# Patient Record
Sex: FEMALE | Race: WHITE | Employment: FULL TIME | URBAN - METROPOLITAN AREA
[De-identification: names, ages, dates, MRNs, and addresses within clinical notes are randomized per-mention and may not be internally consistent; named-entity substitution may affect disease eponyms.]

---

## 2021-10-31 ENCOUNTER — HOSPITAL ENCOUNTER (EMERGENCY)
Age: 21
Discharge: HOME | End: 2021-10-31
Attending: STUDENT IN AN ORGANIZED HEALTH CARE EDUCATION/TRAINING PROGRAM
Payer: COMMERCIAL

## 2021-10-31 VITALS
HEIGHT: 66 IN | BODY MASS INDEX: 33.75 KG/M2 | DIASTOLIC BLOOD PRESSURE: 74 MMHG | TEMPERATURE: 99.6 F | RESPIRATION RATE: 16 BRPM | OXYGEN SATURATION: 99 % | SYSTOLIC BLOOD PRESSURE: 131 MMHG | HEART RATE: 85 BPM | WEIGHT: 210 LBS

## 2021-10-31 DIAGNOSIS — J06.9 UPPER RESPIRATORY TRACT INFECTION, UNSPECIFIED TYPE: Primary | ICD-10-CM

## 2021-10-31 DIAGNOSIS — Z11.52 ENCOUNTER FOR SCREENING FOR COVID-19: ICD-10-CM

## 2021-10-31 LAB
DEPRECATED S PYO AG THROAT QL EIA: NEGATIVE
FLUAV AG NPH QL IA: NEGATIVE
FLUBV AG NOSE QL IA: NEGATIVE
SARS-COV-2, COV2: NORMAL

## 2021-10-31 PROCEDURE — 87804 INFLUENZA ASSAY W/OPTIC: CPT

## 2021-10-31 PROCEDURE — U0005 INFEC AGEN DETEC AMPLI PROBE: HCPCS

## 2021-10-31 PROCEDURE — 74011250637 HC RX REV CODE- 250/637: Performed by: STUDENT IN AN ORGANIZED HEALTH CARE EDUCATION/TRAINING PROGRAM

## 2021-10-31 PROCEDURE — 99283 EMERGENCY DEPT VISIT LOW MDM: CPT

## 2021-10-31 PROCEDURE — 87880 STREP A ASSAY W/OPTIC: CPT

## 2021-10-31 RX ORDER — IBUPROFEN 400 MG/1
400 TABLET ORAL
Status: COMPLETED | OUTPATIENT
Start: 2021-10-31 | End: 2021-10-31

## 2021-10-31 RX ORDER — ACETAMINOPHEN 500 MG
1000 TABLET ORAL
Status: COMPLETED | OUTPATIENT
Start: 2021-10-31 | End: 2021-10-31

## 2021-10-31 RX ADMIN — ACETAMINOPHEN 1000 MG: 500 TABLET ORAL at 13:56

## 2021-10-31 RX ADMIN — IBUPROFEN 400 MG: 400 TABLET, FILM COATED ORAL at 13:56

## 2021-11-01 LAB
SARS-COV-2, XPLCVT: NOT DETECTED
SOURCE, COVRS: NORMAL

## 2023-01-01 NOTE — ED TRIAGE NOTES
Pt c/o headache that began this morning. Also reports sore throat, runny nose, chills. Mamta Rivas  (RN)  2023 05:46:30

## 2023-04-26 ENCOUNTER — APPOINTMENT (OUTPATIENT)
Dept: GENERAL RADIOLOGY | Age: 23
End: 2023-04-26
Attending: STUDENT IN AN ORGANIZED HEALTH CARE EDUCATION/TRAINING PROGRAM
Payer: COMMERCIAL

## 2023-04-26 ENCOUNTER — HOSPITAL ENCOUNTER (EMERGENCY)
Age: 23
Discharge: HOME | End: 2023-04-27
Attending: STUDENT IN AN ORGANIZED HEALTH CARE EDUCATION/TRAINING PROGRAM
Payer: COMMERCIAL

## 2023-04-26 VITALS
WEIGHT: 206 LBS | HEIGHT: 64 IN | RESPIRATION RATE: 18 BRPM | OXYGEN SATURATION: 99 % | TEMPERATURE: 98 F | BODY MASS INDEX: 35.17 KG/M2 | DIASTOLIC BLOOD PRESSURE: 73 MMHG | SYSTOLIC BLOOD PRESSURE: 128 MMHG | HEART RATE: 98 BPM

## 2023-04-26 DIAGNOSIS — S86.912A MUSCLE STRAIN OF LEFT LOWER LEG, INITIAL ENCOUNTER: Primary | ICD-10-CM

## 2023-04-26 PROCEDURE — 99283 EMERGENCY DEPT VISIT LOW MDM: CPT

## 2023-04-26 PROCEDURE — 73630 X-RAY EXAM OF FOOT: CPT

## 2023-04-27 NOTE — ED PROVIDER NOTES
[facility]  EMERGENCY DEPARTMENT ENCOUNTER NOTE        Date: 4/26/2023  Patient Name: Lnyda Stinson      History of Presenting Illness     Chief Complaint   Patient presents with    Ankle Pain    Leg Pain       History Provided By: Patient    HPI: Lynda Stinson, 26 y.o. female with no chronic past medical history of note who comes to the ED with leg injury. She reports that she was on ladder. On the shelf and when she landed she landed on her left foot resulting in pain with movement of the foot, ankle, and calf. She denies any other injuries. She is reporting every time she is moving her calf she feels stretching and pulling sensation. PCP: None        Past History     Past Medical History:  History reviewed. No pertinent past medical history. Past Surgical History:  No past surgical history on file. Family History:  History reviewed. No pertinent family history. Social History: Allergies:  No Known Allergies      Review of Systems     Review of Systems    A 10 point review of system was performed and was negative except as noted above in HPI    Physical Exam     Physical Exam  Vitals and nursing note reviewed. Constitutional:       General: She is not in acute distress. Appearance: She is not ill-appearing, toxic-appearing or diaphoretic. HENT:      Head: Normocephalic and atraumatic. Eyes:      Extraocular Movements: Extraocular movements intact. Conjunctiva/sclera: Conjunctivae normal.   Cardiovascular:      Rate and Rhythm: Normal rate and regular rhythm. Pulmonary:      Effort: Pulmonary effort is normal. No respiratory distress. Musculoskeletal:      Right lower leg: Normal.      Left lower leg: Tenderness present. No swelling, deformity, lacerations or bony tenderness. No edema.       Right ankle: Normal.      Right Achilles Tendon: Normal.      Left ankle: Normal.      Left Achilles Tendon: Normal.      Right foot: Normal.      Left foot: Normal range of motion and normal capillary refill. Tenderness present. No swelling, deformity, bunion, Charcot foot, foot drop, prominent metatarsal heads, laceration, bony tenderness or crepitus. Normal pulse. Neurological:      Mental Status: She is alert and oriented to person, place, and time. Sensory: No sensory deficit. Motor: No weakness. Lab and Diagnostic Study Results     Labs -   No results found for this or any previous visit (from the past 12 hour(s)). Radiologic Studies -   [unfilled]  CT Results  (Last 48 hours)      None          CXR Results  (Last 48 hours)      None            Medical Decision Making and ED Course   - I am the first and primary provider for this patient AND AM THE PRIMARY PROVIDER OF RECORD. - I reviewed the vital signs, available nursing notes, past medical history, past surgical history, family history and social history. - Initial assessment performed. The patients presenting problems have been discussed, and the staff are in agreement with the care plan formulated and outlined with them. I have encouraged them to ask questions as they arise throughout their visit. Vital Signs-Reviewed the patient's vital signs. Patient Vitals for the past 24 hrs:   Temp Pulse Resp BP SpO2   04/26/23 2351 -- -- -- 128/73 --   04/26/23 2210 98 °F (36.7 °C) 98 18 (!) 155/78 99 %       Records Reviewed: Nursing notes      Medical Decision Making       Previously healthy 26-year-old female comes to the ED with chief complaint of lower leg pain on the left. She reports that she landed wrongfully and complaining of pain. Her examination revealed tenderness to the foot. Differential diagnosis includes sprain, strain, or fracture. An x-ray was obtained to rule out the possibility of fracture. X-ray was unremarkable. Her presentation today is not concerning for an acute vascular or nerve injury.   Sensation is intact and motor is intact bilaterally. Pulses are symmetric and equal.  There is no difference between both legs and calf muscles. Presentation is not concerning for DVT or acute infectious process. Patient was placed in a walking boot. Symptomatically improved significantly and she was able to ambulate. Thus, will discharge her with symptomatic treatment with Tylenol and ibuprofen as needed and referred to follow-up with orthopedics within 5 to 7 days for reevaluation. ED evaluation, work-up, clinical impression, and disposition was discussed with the patient. Patient is agreeable. Patient verbalized understanding and will be able to arrange follow-up. Anticipatory guidance and return precautions discussed with the patient. At the time of discharge, all concerns have been addressed and patient had no further questions. Patient is hemodynamically stable and appropriate for discharge. Diagnosis     Clinical Impression:   1. Muscle strain of left lower leg, initial encounter        Disposition     Disposition: Condition improved  DC- Adult Discharges: All of the diagnostic tests were reviewed and questions answered. Diagnosis, care plan and treatment options were discussed. The patient understands the instructions and will follow up as directed. The patients results have been reviewed with them. They have been counseled regarding their diagnosis. The patient verbally convey understanding and agreement of the signs, symptoms, diagnosis, treatment and prognosis and additionally agrees to follow up as recommended with their PCP in 24 - 48 hours. They also agree with the care-plan and convey that all of their questions have been answered.   I have also put together some discharge instructions for them that include: 1) educational information regarding their diagnosis, 2) how to care for their diagnosis at home, as well a 3) list of reasons why they would want to return to the ED prior to their follow-up appointment, should their condition change. Discharged      DISCHARGE PLAN:  1. Follow-up Information       Follow up With Specialties Details Why [address]    [address] [facility] EMERGENCY DEPT Emergency Medicine Go to  As needed, If symptoms worsen [address] [facility] [id number] [address]  Schedule an appointment as soon as possible for a visit on 5/1/2023 For reevaluation, Discuss your visit to the ER [address] [phone]          2. Return to ED if worse   3. There are no discharge medications for this patient. Attestations: Jazmine Vieira MD    Please note that this dictation was completed with [employer], the computer voice recognition software. Quite often unanticipated grammatical, syntax, homophones, and other interpretive errors are inadvertently transcribed by the computer software. Please disregard these errors. Please excuse any errors that have escaped final proofreading. Thank you.

## 2023-04-27 NOTE — ED NOTES
Assumed care of pt at this time. No signs of acute or respiratory distress noted. Pt resting on stretcher in lowest position, with call bell within reach.  Pt awaiting xray results at this time

## 2023-04-27 NOTE — ED NOTES
Provider at bedside for dispo and follow up, all treatments completed as ordered. Discharge plan reviewed and paperwork signed, teaching completed regarding treatment received, medications and follow up care demonstrated and read back, pain level within manageable comfortable limits, ambulatory to exit, gait steady, safety maintained.

## 2023-04-27 NOTE — ED TRIAGE NOTES
Stepped off a curb and started having pain int the ankle up to the calf area. Happened a couple of days ago.

## 2023-05-30 NOTE — ED PROVIDER NOTES
EMERGENCY DEPARTMENT HISTORY AND PHYSICAL EXAM      Date: 10/31/2021  Patient Name: Giovanny Ballard      History of Presenting Illness     Chief Complaint   Patient presents with    Headache    Nasal Congestion       History Provided By: Patient    HPI: Giovanny Ballard, 22 y.o. female with symptoms of upper respiratory tract infection including runny nose, nasal congestion, sore throat, and mild globalized headache. Symptoms of mild severity, started 2 days ago, no known aggravating relieving factors with no associate additional symptoms. She is not vaccinated and not sure if she was exposed to sick contacts. There are no other complaints, changes, or physical findings at this time. PCP: None        Past History     Past Medical History:  None    Past Surgical History:  None    Family History:  No family history on file. Social History:  Social History     Tobacco Use    Smoking status: Not on file   Substance Use Topics    Alcohol use: Not on file    Drug use: Not on file       Allergies:  No Known Allergies      Review of Systems     Review of Systems   Constitutional: Positive for fatigue. Negative for fever. HENT: Positive for congestion and rhinorrhea. Eyes: Negative for pain and discharge. Respiratory: Negative for shortness of breath and wheezing. Cardiovascular: Negative for chest pain and leg swelling. Gastrointestinal: Negative for diarrhea and vomiting. Genitourinary: Negative for dysuria and flank pain. Musculoskeletal: Negative for arthralgias and myalgias. Skin: Negative for color change and rash. Neurological: Positive for headaches. Negative for seizures. Physical Exam     Physical Exam  Vitals and nursing note reviewed. Constitutional:       General: She is not in acute distress. Appearance: She is obese. She is not ill-appearing, toxic-appearing or diaphoretic. HENT:      Head: Normocephalic and atraumatic.       Nose: Congestion and rhinorrhea present. Eyes:      Extraocular Movements: Extraocular movements intact. Conjunctiva/sclera: Conjunctivae normal.   Cardiovascular:      Rate and Rhythm: Normal rate and regular rhythm. Pulmonary:      Effort: Pulmonary effort is normal.      Breath sounds: Normal breath sounds. Musculoskeletal:      Cervical back: Normal range of motion and neck supple. No rigidity. Right lower leg: No edema. Left lower leg: No edema. Lymphadenopathy:      Cervical: No cervical adenopathy. Skin:     General: Skin is warm and dry. Neurological:      Mental Status: She is alert and oriented to person, place, and time. Motor: No weakness. Gait: Gait normal.         Lab and Diagnostic Study Results     Labs -     Recent Results (from the past 12 hour(s))   SARS-COV-2    Collection Time: 10/31/21 12:24 PM   Result Value Ref Range    SARS-CoV-2 Please find results under separate order     INFLUENZA A & B AG (RAPID TEST)    Collection Time: 10/31/21 12:24 PM   Result Value Ref Range    Influenza A Antigen Negative Negative      Influenza B Antigen Negative Negative     STREP AG SCREEN, GROUP A    Collection Time: 10/31/21 12:24 PM    Specimen: Throat   Result Value Ref Range    Group A Strep Ag ID Negative         Radiologic Studies -   [unfilled]  CT Results  (Last 48 hours)    None        CXR Results  (Last 48 hours)    None          Medical Decision Making and ED Course   - I am the first and primary provider for this patient AND AM THE PRIMARY PROVIDER OF RECORD. - I reviewed the vital signs, available nursing notes, past medical history, past surgical history, family history and social history. - Initial assessment performed. The patients presenting problems have been discussed, and the staff are in agreement with the care plan formulated and outlined with them. I have encouraged them to ask questions as they arise throughout their visit.     Vital Signs-Reviewed the patient's vital signs. Patient Vitals for the past 12 hrs:   Temp Pulse Resp BP SpO2   10/31/21 1630  85      10/31/21 1223 99.6 °F (37.6 °C) (!) 112 16 131/74 99 %         Records Reviewed: Nursing Notes    Provider Notes (Medical Decision Making):   Patient presenting with upper respiratory tract-like symptoms. She is nontoxic-appearing. Had mild fever initial evaluation and tachycardia. Tachycardia resolved with administration of Tylenol and ibuprofen. Not tachypneic not hypoxic nor with NSAID. Strep throat and flu swabs were negative. Pending Covid swab and the patient will be called with the results. Otherwise she is discharged to self isolate at home and continue symptomatic management with over-the-counter medications. Patient was counseled on symptoms that would bring her back to the ED. Disposition     Disposition: Condition stable  DC- Adult Discharges: All of the diagnostic tests were reviewed and questions answered. Diagnosis, care plan and treatment options were discussed. The patient understands the instructions and will follow up as directed. The patients results have been reviewed with them. They have been counseled regarding their diagnosis. The patient verbally convey understanding and agreement of the signs, symptoms, diagnosis, treatment and prognosis and additionally agrees to follow up as recommended with their PCP in 24 - 48 hours. They also agree with the care-plan and convey that all of their questions have been answered. I have also put together some discharge instructions for them that include: 1) educational information regarding their diagnosis, 2) how to care for their diagnosis at home, as well a 3) list of reasons why they would want to return to the ED prior to their follow-up appointment, should their condition change. Discharged      DISCHARGE PLAN:  1. There are no discharge medications for this patient.     2.   Follow-up Information     Follow up With Specialties Details Why [address] [facility] EMERGENCY DEPT Emergency Medicine Go to  As needed, If symptoms worsen [address]  [phone]    Primary Care Doctor  Schedule an appointment as soon as possible for a visit in 3 days For reevaluation, Discuss your visit to the ER         3. Return to ED if worse   4. There are no discharge medications for this patient. Diagnosis     Clinical Impression:   1. Upper respiratory tract infection, unspecified type    2. Encounter for screening for COVID-19        Attestations: Adarsh Nieto MD    Please note that this dictation was completed with [email or web address], the computer voice recognition software. Quite often unanticipated grammatical, syntax, homophones, and other interpretive errors are inadvertently transcribed by the computer software. Please disregard these errors. Please excuse any errors that have escaped final proofreading. Thank you. No